# Patient Record
Sex: FEMALE | Race: BLACK OR AFRICAN AMERICAN | Employment: UNEMPLOYED | URBAN - METROPOLITAN AREA
[De-identification: names, ages, dates, MRNs, and addresses within clinical notes are randomized per-mention and may not be internally consistent; named-entity substitution may affect disease eponyms.]

---

## 2018-12-24 RX ORDER — FLECAINIDE ACETATE 100 MG/1
100 TABLET ORAL 2 TIMES DAILY
COMMUNITY

## 2018-12-24 RX ORDER — FESOTERODINE FUMARATE 4 MG/1
TABLET, EXTENDED RELEASE ORAL DAILY
COMMUNITY

## 2018-12-24 RX ORDER — ALBUTEROL SULFATE 90 UG/1
2 AEROSOL, METERED RESPIRATORY (INHALATION)
COMMUNITY

## 2018-12-24 RX ORDER — ASPIRIN 81 MG/1
81 TABLET ORAL DAILY
COMMUNITY

## 2018-12-24 RX ORDER — FLUTICASONE FUROATE AND VILANTEROL 100; 25 UG/1; UG/1
1 POWDER RESPIRATORY (INHALATION) DAILY
COMMUNITY

## 2018-12-27 ENCOUNTER — ANESTHESIA EVENT (OUTPATIENT)
Dept: ENDOSCOPY | Age: 63
End: 2018-12-27
Payer: MEDICARE

## 2018-12-28 ENCOUNTER — ANESTHESIA (OUTPATIENT)
Dept: ENDOSCOPY | Age: 63
End: 2018-12-28
Payer: MEDICARE

## 2018-12-28 ENCOUNTER — HOSPITAL ENCOUNTER (OUTPATIENT)
Age: 63
Setting detail: OUTPATIENT SURGERY
Discharge: HOME | End: 2018-12-28
Attending: INTERNAL MEDICINE | Admitting: INTERNAL MEDICINE
Payer: MEDICARE

## 2018-12-28 VITALS
OXYGEN SATURATION: 100 % | TEMPERATURE: 97.6 F | WEIGHT: 143 LBS | HEIGHT: 62 IN | SYSTOLIC BLOOD PRESSURE: 119 MMHG | DIASTOLIC BLOOD PRESSURE: 62 MMHG | BODY MASS INDEX: 26.31 KG/M2 | HEART RATE: 68 BPM | RESPIRATION RATE: 16 BRPM

## 2018-12-28 PROCEDURE — 76060000031 HC ANESTHESIA FIRST 0.5 HR: Performed by: INTERNAL MEDICINE

## 2018-12-28 PROCEDURE — 76040000019: Performed by: INTERNAL MEDICINE

## 2018-12-28 PROCEDURE — 77030031670 HC DEV INFL ENDOTEK BIG60 MRTM -B: Performed by: INTERNAL MEDICINE

## 2018-12-28 PROCEDURE — 88305 TISSUE EXAM BY PATHOLOGIST: CPT

## 2018-12-28 PROCEDURE — 74011250636 HC RX REV CODE- 250/636: Performed by: NURSE ANESTHETIST, CERTIFIED REGISTERED

## 2018-12-28 PROCEDURE — 74011250636 HC RX REV CODE- 250/636

## 2018-12-28 RX ORDER — DEXTROMETHORPHAN/PSEUDOEPHED 2.5-7.5/.8
1.2 DROPS ORAL
Status: DISCONTINUED | OUTPATIENT
Start: 2018-12-28 | End: 2018-12-28 | Stop reason: HOSPADM

## 2018-12-28 RX ORDER — SODIUM CHLORIDE 0.9 % (FLUSH) 0.9 %
5-10 SYRINGE (ML) INJECTION AS NEEDED
Status: DISCONTINUED | OUTPATIENT
Start: 2018-12-28 | End: 2018-12-28 | Stop reason: HOSPADM

## 2018-12-28 RX ORDER — FAMOTIDINE 20 MG/1
20 TABLET, FILM COATED ORAL ONCE
Status: DISCONTINUED | OUTPATIENT
Start: 2018-12-28 | End: 2018-12-28 | Stop reason: HOSPADM

## 2018-12-28 RX ORDER — SODIUM CHLORIDE 0.9 % (FLUSH) 0.9 %
5-10 SYRINGE (ML) INJECTION EVERY 8 HOURS
Status: DISCONTINUED | OUTPATIENT
Start: 2018-12-28 | End: 2018-12-28 | Stop reason: HOSPADM

## 2018-12-28 RX ORDER — SODIUM CHLORIDE, SODIUM LACTATE, POTASSIUM CHLORIDE, CALCIUM CHLORIDE 600; 310; 30; 20 MG/100ML; MG/100ML; MG/100ML; MG/100ML
25 INJECTION, SOLUTION INTRAVENOUS CONTINUOUS
Status: DISCONTINUED | OUTPATIENT
Start: 2018-12-28 | End: 2018-12-28 | Stop reason: HOSPADM

## 2018-12-28 RX ORDER — PROPOFOL 10 MG/ML
INJECTION, EMULSION INTRAVENOUS AS NEEDED
Status: DISCONTINUED | OUTPATIENT
Start: 2018-12-28 | End: 2018-12-28 | Stop reason: HOSPADM

## 2018-12-28 RX ORDER — LIDOCAINE HYDROCHLORIDE 20 MG/ML
INJECTION, SOLUTION EPIDURAL; INFILTRATION; INTRACAUDAL; PERINEURAL AS NEEDED
Status: DISCONTINUED | OUTPATIENT
Start: 2018-12-28 | End: 2018-12-28 | Stop reason: HOSPADM

## 2018-12-28 RX ADMIN — PROPOFOL 10 MG: 10 INJECTION, EMULSION INTRAVENOUS at 09:13

## 2018-12-28 RX ADMIN — PROPOFOL 10 MG: 10 INJECTION, EMULSION INTRAVENOUS at 09:15

## 2018-12-28 RX ADMIN — LIDOCAINE HYDROCHLORIDE 30 MG: 20 INJECTION, SOLUTION EPIDURAL; INFILTRATION; INTRACAUDAL; PERINEURAL at 09:11

## 2018-12-28 RX ADMIN — PROPOFOL 10 MG: 10 INJECTION, EMULSION INTRAVENOUS at 09:19

## 2018-12-28 RX ADMIN — SODIUM CHLORIDE, SODIUM LACTATE, POTASSIUM CHLORIDE, AND CALCIUM CHLORIDE 25 ML/HR: 600; 310; 30; 20 INJECTION, SOLUTION INTRAVENOUS at 08:48

## 2018-12-28 RX ADMIN — PROPOFOL 10 MG: 10 INJECTION, EMULSION INTRAVENOUS at 09:12

## 2018-12-28 RX ADMIN — PROPOFOL 50 MG: 10 INJECTION, EMULSION INTRAVENOUS at 09:11

## 2018-12-28 RX ADMIN — PROPOFOL 10 MG: 10 INJECTION, EMULSION INTRAVENOUS at 09:17

## 2018-12-28 NOTE — H&P
Date of Surgery Update: 
Mei Rico was seen and examined. History and physical has been reviewed. The patient has been examined.  There have been no significant clinical changes since the completion of the originally dated History and Physical. 
 
Signed By: Aniya Xie MD   
 December 28, 2018 8:26 AM

## 2018-12-28 NOTE — ANESTHESIA POSTPROCEDURE EVALUATION
Procedure(s): 
COLONOSCOPY. Anesthesia Post Evaluation Multimodal analgesia: multimodal analgesia used between 6 hours prior to anesthesia start to PACU discharge Patient location during evaluation: bedside Patient participation: complete - patient participated Level of consciousness: awake Pain management: adequate Airway patency: patent Anesthetic complications: no 
Cardiovascular status: stable Respiratory status: acceptable Hydration status: acceptable Post anesthesia nausea and vomiting:  controlled Visit Vitals /62 Pulse 68 Temp 36.4 °C (97.6 °F) Resp 16 Ht 5' 2\" (1.575 m) Wt 64.9 kg (143 lb) SpO2 100% Breastfeeding? No  
BMI 26.16 kg/m²

## 2018-12-28 NOTE — ANESTHESIA PREPROCEDURE EVALUATION
Anesthetic History No history of anesthetic complications Review of Systems / Medical History Patient summary reviewed and pertinent labs reviewed Pulmonary COPD: mild Sleep apnea: No treatment Smoker Neuro/Psych Within defined limits Cardiovascular Hypertension Dysrhythmias : atrial fibrillation Exercise tolerance: >4 METS Comments: Off eliquis for 3 days GI/Hepatic/Renal 
Within defined limits Endo/Other Within defined limits Other Findings Comments:  
 
 
  
 
 
 
 
Physical Exam 
 
Airway Mallampati: II 
TM Distance: 4 - 6 cm Neck ROM: normal range of motion Mouth opening: Normal 
 
 Cardiovascular Regular rate and rhythm,  S1 and S2 normal,  no murmur, click, rub, or gallop Rhythm: regular Rate: normal 
 
 
 
 Dental 
 
Dentition: Full upper dentures and Full lower dentures Pulmonary Breath sounds clear to auscultation Abdominal 
GI exam deferred Other Findings Anesthetic Plan ASA: 3 Anesthesia type: MAC Induction: Intravenous Anesthetic plan and risks discussed with: Patient

## 2018-12-28 NOTE — PROCEDURES
Colonoscopy Report    Patient: Kathy Bravo MRN: [id number]  SSN: [id number]    YOB: 1955  Age: 59 y.o. Sex: female      Date of Procedure: 12/28/2018    IMPRESSION:  1. Single 5mm ascending colon polyp removed with cold snare. 2. Small hemorrhoids. 3. Otherwise normal colonoscopy including terminal ileum. RECOMMENDATIONS:  1. Resume regular diet, Recommend high fiber. 2. Will contact with polyp results in 2 weeks. These results will determine timing to next screening. Patient will be instructed to contact our office if they have not received the results by three weeks. Indication:  Personal history of colon polyps (screening only)  Procedure Performed: Colonoscopy polypectomy (cold snare)  Endoscopist: Fox Portillo MD  Assistant: Endoscopy Technician-1: Fam Nayak  Endoscopy RN-1: Wallene Aschoff, RN  ASA: ASA 3 - Patient with moderate systemic disease with functional limitations  Mallampati Score: II (soft palate, uvula, fauces visible)  Anesthesia: MAC anesthesia  Endoscope:     [x]  CF H 190AL   []  PCF H190AL   []  GIF H 190    Extent of Examination:terminal ileum  Quality of Preparation:     []  Excellent   [x]  Very Good   [] Fair but adequate   [] Fair, inadequate   []  Poor      Technique: The procedure was discussed with the patient including risks, benefits, alternatives including risks of IV sedation, bleeding, perforation and missed polyp. A safety timeout was performed. The patient was given incremental doses of intravenous sedation to achieve moderate sedation. The patients vital signs were monitored at all times including heart rate, rhythm, blood pressure and oxygen saturation. The patient was placed in left lateral position. When adequate sedation was achieved a perianal inspection and a digital rectal exam were performed. Video colonoscope was introduced into the rectum and advanced under direct vision up to the terminal ileum.  The cecum was identified by IC valve, appendiceal orifice and crows foot. With adequate insufflation and maneuvering of the withdrawing scope, the colonic mucosa was visualized carefully. Retroflexion was performed in the rectum and the distal rectum visualized. The patient tolerated the procedure very well and was transferred to recovery area. Findings:  1. Normal rectal exam.   2. Normal terminal ileum with no ulceration, mass, stricture. 3. There was a single 5mm sessile polyp in the ascending colon. The polyp was removed with cold snare. 4. There were small hemorrhoids in the distal rectum. 5. The colonic mucosa was otherwise normal with no other polyps and no ulceration, mass, stricture.        EBL:Minimal  Specimen:   ID Type Source Tests Collected by Time Destination   1 : cold snare polyp Preservative Colon, Ascending  Sade Ceballos MD 12/28/2018 3570 Pathology       Haylie Ibarra MD  December 28, 2018  9:24 AM

## 2018-12-28 NOTE — DISCHARGE INSTRUCTIONS
Patient Discharge Instructions    Kathy Bravo / [id number] : 1955    Admitted 2018 Discharged: 2018         Procedure Impression:  1. Single 5mm ascending colon polyp removed with cold snare. 2. Small hemorrhoids. 3. Otherwise normal colonoscopy including terminal ileum. Recommendation:  1. Resume regular diet, recommend high fiber  2. Will contact with polyp results in 2 weeks. These results will determine timing to next colon cancer screening. 3. Please contact our office if you have not received the results by three weeks. Recommended Diet: Regular Diet    Recommended Activity:    1. Do not drink alcohol, drive or operate machinery for 12 hours   2. Call if any fever, abdominal pain or bleeding noted. Signed By: Fox Portillo MD     2018         DISCHARGE SUMMARY from Nurse    PATIENT INSTRUCTIONS:    After general anesthesia or intravenous sedation, for 24 hours or while taking prescription Narcotics:  · Limit your activities  · Do not drive and operate hazardous machinery  · Do not make important personal or business decisions  · Do  not drink alcoholic beverages  · If you have not urinated within 8 hours after discharge, please contact your surgeon on call. Report the following to your surgeon:  · Excessive pain, swelling, redness or odor of or around the surgical area  · Temperature over 100.5  · Nausea and vomiting lasting longer than 4 hours or if unable to take medications  · Any signs of decreased circulation or nerve impairment to extremity: change in color, persistent  numbness, tingling, coldness or increase pain  · Any questions    What to do at Home:    These are general instructions for a healthy lifestyle:    No smoking/ No tobacco products/ Avoid exposure to second hand smoke  Surgeon General's Warning:  Quitting smoking now greatly reduces serious risk to your health.     Obesity, smoking, and sedentary lifestyle greatly increases your risk for illness    A healthy diet, regular physical exercise & weight monitoring are important for maintaining a healthy lifestyle    You may be retaining fluid if you have a history of heart failure or if you experience any of the following symptoms:  Weight gain of 3 pounds or more overnight or 5 pounds in a week, increased swelling in our hands or feet or shortness of breath while lying flat in bed. Please call your doctor as soon as you notice any of these symptoms; do not wait until your next office visit. Recognize signs and symptoms of STROKE:    F-face looks uneven    A-arms unable to move or move unevenly    S-speech slurred or non-existent    T-time-call 911 as soon as signs and symptoms begin-DO NOT go       Back to bed or wait to see if you get better-TIME IS BRAIN. Warning Signs of HEART ATTACK     Call 911 if you have these symptoms:   Chest discomfort. Most heart attacks involve discomfort in the center of the chest that lasts more than a few minutes, or that goes away and comes back. It can feel like uncomfortable pressure, squeezing, fullness, or pain.  Discomfort in other areas of the upper body. Symptoms can include pain or discomfort in one or both arms, the back, neck, jaw, or stomach.  Shortness of breath with or without chest discomfort.  Other signs may include breaking out in a cold sweat, nausea, or lightheadedness. Don't wait more than five minutes to call 911 - MINUTES MATTER! Fast action can save your life. Calling 911 is almost always the fastest way to get lifesaving treatment. Emergency Medical Services staff can begin treatment when they arrive -- up to an hour sooner than if someone gets to the hospital by car. The discharge information has been reviewed with the patient. The patient verbalized understanding. Discharge medications reviewed with the patient and appropriate educational materials and side effects teaching were provided.   Patient armband removed and given to patient to take home.   Patient was informed of the privacy risks if armband lost or stolen  ___________________________________________________________________________________________________________________________________

## 2018-12-28 NOTE — PERIOP NOTES
VSS.  Oriented daughter and pt to room and POC. UP to recline. IV removed. Dr. Deyvi Landrum by bedside. PT d/c to home. Daughter, Kin Madelin .

## 2023-09-13 PROBLEM — M77.41 METATARSALGIA OF RIGHT FOOT: Status: ACTIVE | Noted: 2023-09-13

## 2023-09-13 PROBLEM — E66.01 OBESITY, MORBID (HCC): Status: ACTIVE | Noted: 2023-06-16

## 2023-09-13 PROBLEM — G47.01 INSOMNIA SECONDARY TO CHRONIC PAIN: Status: ACTIVE | Noted: 2023-07-17

## 2023-09-13 PROBLEM — M20.41 HAMMERTOE OF RIGHT FOOT: Status: ACTIVE | Noted: 2023-09-13

## 2023-09-13 PROBLEM — M81.0 AGE-RELATED OSTEOPOROSIS WITHOUT CURRENT PATHOLOGICAL FRACTURE: Status: ACTIVE | Noted: 2023-08-23

## 2023-09-13 PROBLEM — M85.852 OSTEOPENIA OF BOTH THIGHS: Status: ACTIVE | Noted: 2023-08-30

## 2023-09-13 PROBLEM — G89.29 INSOMNIA SECONDARY TO CHRONIC PAIN: Status: ACTIVE | Noted: 2023-07-17

## 2023-09-13 PROBLEM — E55.9 VITAMIN D DEFICIENCY: Status: ACTIVE | Noted: 2021-10-14

## 2023-09-13 PROBLEM — M53.3 SI (SACROILIAC) JOINT DYSFUNCTION: Status: ACTIVE | Noted: 2019-03-14

## 2023-09-13 PROBLEM — G56.21 CUBITAL TUNNEL SYNDROME ON RIGHT: Status: ACTIVE | Noted: 2017-10-11

## 2023-09-13 PROBLEM — M48.02 DEGENERATIVE CERVICAL SPINAL STENOSIS: Status: ACTIVE | Noted: 2017-10-11

## 2023-09-13 PROBLEM — F17.200 CURRENT SMOKER: Status: ACTIVE | Noted: 2023-09-13

## 2023-09-13 PROBLEM — M85.851 OSTEOPENIA OF BOTH THIGHS: Status: ACTIVE | Noted: 2023-08-30

## 2023-09-13 PROBLEM — D84.9 IMMUNOSUPPRESSION (HCC): Status: ACTIVE | Noted: 2023-07-26

## 2023-09-13 PROBLEM — I70.0 ATHEROSCLEROSIS OF AORTA (HCC): Status: ACTIVE | Noted: 2019-02-14

## 2024-05-21 ENCOUNTER — OFFICE VISIT (OUTPATIENT)
Age: 69
End: 2024-05-21
Payer: COMMERCIAL

## 2024-05-21 VITALS
OXYGEN SATURATION: 97 % | WEIGHT: 161 LBS | SYSTOLIC BLOOD PRESSURE: 143 MMHG | HEART RATE: 85 BPM | TEMPERATURE: 97.2 F | DIASTOLIC BLOOD PRESSURE: 102 MMHG | BODY MASS INDEX: 29.45 KG/M2

## 2024-05-21 DIAGNOSIS — R06.02 EXERTIONAL SHORTNESS OF BREATH: Primary | ICD-10-CM

## 2024-05-21 DIAGNOSIS — I50.32 CHRONIC DIASTOLIC (CONGESTIVE) HEART FAILURE (HCC): ICD-10-CM

## 2024-05-21 DIAGNOSIS — R60.0 BILATERAL LEG EDEMA: ICD-10-CM

## 2024-05-21 DIAGNOSIS — R00.2 PALPITATIONS: ICD-10-CM

## 2024-05-21 DIAGNOSIS — I48.0 PAROXYSMAL ATRIAL FIBRILLATION (HCC): ICD-10-CM

## 2024-05-21 DIAGNOSIS — R94.31 ABNORMAL ECG: ICD-10-CM

## 2024-05-21 DIAGNOSIS — I10 PRIMARY HYPERTENSION: ICD-10-CM

## 2024-05-21 DIAGNOSIS — R01.1 SYSTOLIC MURMUR: ICD-10-CM

## 2024-05-21 PROCEDURE — 3080F DIAST BP >= 90 MM HG: CPT | Performed by: INTERNAL MEDICINE

## 2024-05-21 PROCEDURE — 99215 OFFICE O/P EST HI 40 MIN: CPT | Performed by: INTERNAL MEDICINE

## 2024-05-21 PROCEDURE — 1123F ACP DISCUSS/DSCN MKR DOCD: CPT | Performed by: INTERNAL MEDICINE

## 2024-05-21 PROCEDURE — 3077F SYST BP >= 140 MM HG: CPT | Performed by: INTERNAL MEDICINE

## 2024-05-21 RX ORDER — DAPAGLIFLOZIN 10 MG/1
10 TABLET, FILM COATED ORAL EVERY MORNING
Qty: 14 TABLET | Refills: 0 | COMMUNITY
Start: 2024-05-21 | End: 2024-06-04

## 2024-05-21 ASSESSMENT — ENCOUNTER SYMPTOMS
EYES NEGATIVE: 1
SHORTNESS OF BREATH: 1
GASTROINTESTINAL NEGATIVE: 1
ALLERGIC/IMMUNOLOGIC NEGATIVE: 1

## 2024-05-21 NOTE — PROGRESS NOTES
Identified pt with two pt identifiers(name and ). Reviewed record in preparation for visit and have obtained necessary documentation.    Lisa Nguyen presents today for No chief complaint on file.      Pt c/o DIZZINESS, SOB, CHEST PRESSURE, FATIGUE/WEAKNESS, HEADACHES, SWELLING (LL).             Lisa Nguyen preferred language for health care discussion is english/other.    Personal Protective Equipment:   Personal Protective Equipment was used including: mask-surgical and hands-gloves. Patient was placed on no precaution(s). Patient was not masked.    Precautions:   Patient currently on None  Patient currently roomed with door closed.    Is someone accompanying this pt? no    Is the patient using any DME equipment during OV? no    Depression Screening:       No data to display                 Learning Assessment:  No question data found.    Abuse Screenin/21/2024    10:00 AM   AMB Abuse Screening   Do you ever feel afraid of your partner? N   Are you in a relationship with someone who physically or mentally threatens you? N   Is it safe for you to go home? Y          Fall Risk      2024    10:57 AM   Fall Risk   2 or more falls in past year? no   Fall with injury in past year? no         Pt currently taking Anticoagulant /Antiplatelet therapy? no    Coordination of Care:  1. Have you been to the ER, urgent care clinic since your last visit? Hospitalized since your last visit? no    2. Have you seen or consulted any other health care providers outside of the [facility] since your last visit? Include any pap smears or colon screening. no      Please see Red banners under Allergies and Med Rec to remove outside inquires. All correct information has been verified with patient and added to chart.     Medication's patient's would liked removed has been marked not taking to be removed per Verbal order and read back per Juan David Obando MD  
09/11/2017    History of supraventricular tachycardia 10/25/2017    Hot flashes     Hypertension     Hypokalemia 02/03/2014    Hyponatremia 02/02/2014    due to HCTZ (now stopped) and psychogenic polydipsia.  >6L fluids / day    Insomnia     Knee pain     Muscle atrophy     Nocturia     Obstructive chronic bronchitis without exacerbation     Osteoarthritis     Rheumatoid arthritis (HCC)     Sciatica     Smoker     Teeth missing 11/2015    Vertical strabismus of left eye         Past Surgical History:   Procedure Laterality Date    BACK SURGERY      12 yrs ago    BUNIONECTOMY  06/23/2017    KNEE ARTHROSCOPY  12/02/2015    LUMBAR LAMINECTOMY  02/20/2020    SPINE SURGERY  06/09/2022        Allergies   Allergen Reactions    Adalimumab      Other reaction(s): rash/itching  Other reaction(s): rash/itching    Oxycodone-Acetaminophen Rash        Current Outpatient Medications   Medication Sig Dispense Refill    dapagliflozin (FARXIGA) 10 MG tablet Take 1 tablet by mouth every morning for 14 days 14 tablet 0    ELIQUIS 5 MG TABS tablet Take 1 tablet by mouth in the morning and at bedtime      aspirin 81 MG EC tablet Take 1 tablet by mouth daily      carvedilol (COREG) 12.5 MG tablet Take 1 tablet by mouth 2 times daily      flecainide (TAMBOCOR) 100 MG tablet Take 1 tablet by mouth 2 times daily      lisinopril (PRINIVIL;ZESTRIL) 20 MG tablet Take 1 tablet by mouth daily      NIFEdipine (PROCARDIA XL) 30 MG extended release tablet Take 1 tablet by mouth daily      darifenacin (ENABLEX) 15 MG extended release tablet Take 1 tablet by mouth daily 90 tablet 3    vibegron (GEMTESA) 75 MG TABS tablet Take 1 tablet by mouth daily 90 tablet 3    levoFLOXacin (LEVAQUIN) 500 MG tablet       clobetasol (TEMOVATE) 0.05 % ointment APPLY 1 (ONE) THIN LAYER TO THE ARMS AND BACK TWICE A DAY, ESPECIALLY AFTER SHOWERING      bumetanide (BUMEX) 1 MG tablet Take 1 tablet by mouth Every Day (Patient not taking: Reported on 5/21/2024)

## 2024-10-23 PROBLEM — M25.562 CHRONIC PAIN OF LEFT KNEE: Status: ACTIVE | Noted: 2023-10-19

## 2024-10-23 PROBLEM — R91.8 LUNG NODULE, MULTIPLE: Status: ACTIVE | Noted: 2024-02-20

## 2024-10-23 PROBLEM — R63.4 WEIGHT LOSS: Status: ACTIVE | Noted: 2023-10-19

## 2024-10-23 PROBLEM — G89.29 CHRONIC PAIN OF LEFT KNEE: Status: ACTIVE | Noted: 2023-10-19

## 2024-10-23 PROBLEM — R53.82 CHRONIC FATIGUE: Status: ACTIVE | Noted: 2023-10-19

## 2024-10-23 PROBLEM — R74.8 ELEVATED ALKALINE PHOSPHATASE LEVEL: Status: ACTIVE | Noted: 2023-11-02

## 2024-10-23 PROBLEM — I50.32 CHRONIC DIASTOLIC HEART FAILURE (HCC): Status: ACTIVE | Noted: 2024-10-23

## 2024-10-30 ENCOUNTER — TELEPHONE (OUTPATIENT)
Age: 69
End: 2024-10-30

## 2024-10-30 NOTE — TELEPHONE ENCOUNTER
----- Message from Dr. Juan David Obando MD sent at 5/21/2024 11:26 AM EDT -----  Regarding: SOB  See if farxiga has helped her SOB. I gave her 2 weeks

## 2024-10-30 NOTE — TELEPHONE ENCOUNTER
Called patient, no answer at the number listed. I did leave a voice message asking her to return call to office.

## 2024-10-31 NOTE — TELEPHONE ENCOUNTER
Called patient in f/u this evening and she identified herself by stating her full name and . I inquired about the medication samples that Dr. Obando gave her for SOB. She said she did take them and it was beneficial, as she was able to do her ADL's, walk to the car and in stores with improvement in her breathing.    I will place a refill in a separate encounter for her and send to Dr. Obando.   I confirmed her pharmacy and also reminded her to call the pharmacy In a couple of days.    Reminded her about her f/u appointment in mid November.

## 2024-10-31 NOTE — TELEPHONE ENCOUNTER
PCP: Kohlerman, Nicholas, MD    Last appt:  5/21/2024   Future Appointments   Date Time Provider Department Center   11/21/2024  8:30 AM BS [facility] AM[facility]   11/21/2024 10:15 AM Juan David Obando Sr., MD [facility] AMB   12/27/2024  8:40 AM Krissy Serrano, APRN - NP [facility] [place] Sched       Requested Prescriptions     Pending Prescriptions Disp Refills    dapagliflozin (FARXIGA) 10 MG tablet 90 tablet 3     Sig: Take 1 tablet by mouth every morning       Request for a 30 or 90 day supply? Provider Discretion    Pharmacy: confirmed with patient.    Other Comments: message related to medication is in the patient's chart.

## 2024-11-05 RX ORDER — DAPAGLIFLOZIN 10 MG/1
10 TABLET, FILM COATED ORAL EVERY MORNING
Qty: 90 TABLET | Refills: 3 | Status: SHIPPED | OUTPATIENT
Start: 2024-11-05

## 2024-11-11 DIAGNOSIS — R06.02 SHORTNESS OF BREATH: Primary | ICD-10-CM

## 2024-11-22 RX ORDER — APIXABAN 5 MG/1
5 TABLET, FILM COATED ORAL 2 TIMES DAILY
Qty: 60 TABLET | Refills: 10 | Status: SHIPPED | OUTPATIENT
Start: 2024-11-22

## 2024-12-09 NOTE — TELEPHONE ENCOUNTER
PCP: Kohlerman, Nicholas, MD    Last appt:  5/21/2024   Future Appointments   Date Time Provider Department Center   12/27/2024  8:40 AM Krissy Serrano APRN - NP [facility] Sched       Requested Prescriptions     Pending Prescriptions Disp Refills    flecainide (TAMBOCOR) 100 MG tablet [Pharmacy Med Name: FLECAINIDE 100MG TABLET 100 Tablet] 60 tablet 10     Sig: TAKE 1 TABLET BY MOUTH TWICE DAILY       Request for a 30 or 90 day supply? Provider Discretion    Pharmacy: confirmed     Other Comments: n/a

## 2024-12-15 RX ORDER — FLECAINIDE ACETATE 100 MG/1
100 TABLET ORAL 2 TIMES DAILY
Qty: 60 TABLET | Refills: 10 | Status: SHIPPED | OUTPATIENT
Start: 2024-12-15

## 2025-01-02 RX ORDER — NIFEDIPINE 30 MG/1
30 TABLET, EXTENDED RELEASE ORAL DAILY
Qty: 30 TABLET | Refills: 10 | Status: SHIPPED | OUTPATIENT
Start: 2025-01-02

## 2025-01-23 ENCOUNTER — OFFICE VISIT (OUTPATIENT)
Age: 70
End: 2025-01-23
Payer: MEDICARE

## 2025-01-23 VITALS
WEIGHT: 166 LBS | OXYGEN SATURATION: 98 % | TEMPERATURE: 97.4 F | DIASTOLIC BLOOD PRESSURE: 73 MMHG | BODY MASS INDEX: 30.55 KG/M2 | SYSTOLIC BLOOD PRESSURE: 106 MMHG | HEIGHT: 62 IN | HEART RATE: 76 BPM

## 2025-01-23 DIAGNOSIS — R94.31 ABNORMAL ECG: ICD-10-CM

## 2025-01-23 DIAGNOSIS — R07.9 CHEST PAIN, UNSPECIFIED TYPE: ICD-10-CM

## 2025-01-23 DIAGNOSIS — I50.32 CHRONIC DIASTOLIC (CONGESTIVE) HEART FAILURE (HCC): ICD-10-CM

## 2025-01-23 DIAGNOSIS — I10 PRIMARY HYPERTENSION: ICD-10-CM

## 2025-01-23 DIAGNOSIS — R00.2 PALPITATIONS: ICD-10-CM

## 2025-01-23 DIAGNOSIS — Z79.01 LONG TERM (CURRENT) USE OF ANTICOAGULANTS: ICD-10-CM

## 2025-01-23 DIAGNOSIS — R05.3 CHRONIC COUGH: ICD-10-CM

## 2025-01-23 DIAGNOSIS — R06.02 EXERTIONAL SHORTNESS OF BREATH: Primary | ICD-10-CM

## 2025-01-23 DIAGNOSIS — Z72.0 TOBACCO ABUSE: ICD-10-CM

## 2025-01-23 DIAGNOSIS — R01.1 SYSTOLIC MURMUR: ICD-10-CM

## 2025-01-23 DIAGNOSIS — I48.0 PAROXYSMAL ATRIAL FIBRILLATION (HCC): ICD-10-CM

## 2025-01-23 DIAGNOSIS — I73.9 CLAUDICATION (HCC): ICD-10-CM

## 2025-01-23 PROCEDURE — 93000 ELECTROCARDIOGRAM COMPLETE: CPT | Performed by: INTERNAL MEDICINE

## 2025-01-23 PROCEDURE — 3074F SYST BP LT 130 MM HG: CPT | Performed by: INTERNAL MEDICINE

## 2025-01-23 PROCEDURE — 1159F MED LIST DOCD IN RCRD: CPT | Performed by: INTERNAL MEDICINE

## 2025-01-23 PROCEDURE — 99215 OFFICE O/P EST HI 40 MIN: CPT | Performed by: INTERNAL MEDICINE

## 2025-01-23 PROCEDURE — 1123F ACP DISCUSS/DSCN MKR DOCD: CPT | Performed by: INTERNAL MEDICINE

## 2025-01-23 PROCEDURE — 3078F DIAST BP <80 MM HG: CPT | Performed by: INTERNAL MEDICINE

## 2025-01-23 PROCEDURE — 1126F AMNT PAIN NOTED NONE PRSNT: CPT | Performed by: INTERNAL MEDICINE

## 2025-01-23 PROCEDURE — 1160F RVW MEDS BY RX/DR IN RCRD: CPT | Performed by: INTERNAL MEDICINE

## 2025-01-23 ASSESSMENT — PATIENT HEALTH QUESTIONNAIRE - PHQ9
2. FEELING DOWN, DEPRESSED OR HOPELESS: NOT AT ALL
1. LITTLE INTEREST OR PLEASURE IN DOING THINGS: NOT AT ALL
SUM OF ALL RESPONSES TO PHQ9 QUESTIONS 1 & 2: 0
SUM OF ALL RESPONSES TO PHQ QUESTIONS 1-9: 0

## 2025-01-23 ASSESSMENT — ENCOUNTER SYMPTOMS
EYES NEGATIVE: 1
GASTROINTESTINAL NEGATIVE: 1
ALLERGIC/IMMUNOLOGIC NEGATIVE: 1
SHORTNESS OF BREATH: 1

## 2025-01-23 NOTE — PROGRESS NOTES
Lisa Nguyen (:  1955) is a 69 y.o. female,Established patient, here for evaluation of the following chief complaint(s):  6 Month Follow-Up    Subjective   SUBJECTIVE/OBJECTIVE:  History of Present Illness  The patient presents for left-sided chest pain, cough, and palpitations. She has a history of atrial fibrillation noted for the last several years. She does have periodic palpitations and was found to have recurrent atrial fibrillation previously. She has had multiple cardioversions prior to her coming to see me. I changed her medications to flecainide and she has done relatively well since that time. She does get palpitations periodically, but rarely. She has atypical-type chest discomfort noted previously and underwent a diagnostic catheterization in  demonstrating no significant occlusion. Patient had a stress test performed in 2019 which was negative    She reports experiencing intermittent, mild left-sided chest pain, described as sharp in nature. The onset of this pain is unpredictable, with no identifiable triggers. The most recent episode occurred yesterday, and the frequency varies from daily to twice daily. Each episode lasts approximately 2 to 3 seconds before resolving spontaneously. She also reports experiencing palpitations.    Additionally, she has been managing a persistent cough for the past 2 to 3 years. Despite her history of smoking, she continues to engage in the habit, albeit at a reduced rate.    She maintains an active lifestyle, including regular walks with her granddaughter, but notes that these activities often result in fatigue.    SOCIAL HISTORY  The patient admits to smoking a little bit but not as much as before.    I have carefully reviewed all available medical records, previous office notes, lab, x-ray and procedure reports    Past Medical History:   Diagnosis Date    Acute systolic heart failure (HCC) 02/10/2014    Amblyopia of left eye     Anxiety

## 2025-01-23 NOTE — PROGRESS NOTES
1. \"Have you been to the ER, urgent care clinic since your last visit?  Hospitalized since your last visit?\" Reviewed by Dr. Juan David Obando     2. \"Have you seen or consulted any other health care providers outside of the [facility] since your last visit?\" Reviewed by Dr. Juan David Obando

## 2025-03-05 ENCOUNTER — TELEPHONE (OUTPATIENT)
Age: 70
End: 2025-03-05

## 2025-03-05 NOTE — TELEPHONE ENCOUNTER
Called Lisa Nguyen, at [phone], no answer, voicemail full.     Called her son Duane Dudley who is on her HIPAA page, at [phone],  two pt identifiers verified, name and  for Lisa Nguyen.    Gave him the results below:    ----- Message from Dr. Juan David Obando MD sent at 3/5/2025 11:03 AM EST -----  Call her let her know her stress test was negative for any blockage    Asked that and asked that he make sure she gets the results.     Lisa Nguyen voiced understanding and states he will.

## 2025-04-08 ENCOUNTER — OFFICE VISIT (OUTPATIENT)
Age: 70
End: 2025-04-08

## 2025-04-08 VITALS — WEIGHT: 154 LBS | RESPIRATION RATE: 16 BRPM | HEIGHT: 62 IN | BODY MASS INDEX: 28.34 KG/M2

## 2025-04-08 DIAGNOSIS — M70.61 TROCHANTERIC BURSITIS OF BOTH HIPS: ICD-10-CM

## 2025-04-08 DIAGNOSIS — M70.62 TROCHANTERIC BURSITIS OF BOTH HIPS: ICD-10-CM

## 2025-04-08 DIAGNOSIS — M76.32 ILIOTIBIAL BAND SYNDROME OF LEFT SIDE: ICD-10-CM

## 2025-04-08 DIAGNOSIS — M25.69 DECREASED ROM OF TRUNK AND BACK: ICD-10-CM

## 2025-04-08 DIAGNOSIS — M47.817 DJD (DEGENERATIVE JOINT DISEASE), LUMBOSACRAL: ICD-10-CM

## 2025-04-08 DIAGNOSIS — M70.62 TROCHANTERIC BURSITIS OF LEFT HIP: ICD-10-CM

## 2025-04-08 DIAGNOSIS — Z98.890 HX OF LUMBOSACRAL SPINE SURGERY: ICD-10-CM

## 2025-04-08 DIAGNOSIS — M47.816 FACET ARTHROPATHY, LUMBAR: ICD-10-CM

## 2025-04-08 DIAGNOSIS — M54.16 LUMBAR RADICULOPATHY: Primary | ICD-10-CM

## 2025-04-08 RX ORDER — TRIAMCINOLONE ACETONIDE 40 MG/ML
80 INJECTION, SUSPENSION INTRA-ARTICULAR; INTRAMUSCULAR ONCE
Status: COMPLETED | OUTPATIENT
Start: 2025-04-08 | End: 2025-04-08

## 2025-04-08 RX ORDER — BUPIVACAINE HYDROCHLORIDE 5 MG/ML
7 INJECTION, SOLUTION PERINEURAL ONCE
Status: COMPLETED | OUTPATIENT
Start: 2025-04-08 | End: 2025-04-08

## 2025-04-08 RX ADMIN — TRIAMCINOLONE ACETONIDE 80 MG: 40 INJECTION, SUSPENSION INTRA-ARTICULAR; INTRAMUSCULAR at 11:12

## 2025-04-08 RX ADMIN — BUPIVACAINE HYDROCHLORIDE 35 MG: 5 INJECTION, SOLUTION PERINEURAL at 11:05

## 2025-04-08 NOTE — PROGRESS NOTES
discussed in detail all patient's questions answered to their satisfaction.        Patient provided a reminder for a \"due or due soon\" health maintenance. I have asked the patient to schedule an appointment with their primary care provider for follow-up on general health maintenance concerns.    Today all the patient's questions were answered to their satisfaction.  Copies of x-rays reviewed if obtained this visit, and provided to patient.        Dictation disclaimer:  Please note that this dictation was completed with \"Dragon\", the computer voice recognition software. Today's exam was dictated using fluency dictation software (voice recognition software). Occasionally, sound-a-like computer induced   dictation errors will populate the report.  Quite often unanticipated grammatical, syntax, homophones, and other interpretive errors are inadvertently transcribed by the computer software.  Please disregard these errors.  Please excuse any errors that have escaped final proofreading.        Roberto SUNG, APC, MPAS, PA-C  [facility]  [facility]

## 2025-04-17 ENCOUNTER — OFFICE VISIT (OUTPATIENT)
Age: 70
End: 2025-04-17

## 2025-04-17 VITALS — BODY MASS INDEX: 28.34 KG/M2 | WEIGHT: 154 LBS | HEIGHT: 62 IN

## 2025-04-17 DIAGNOSIS — M47.816 FACET ARTHROPATHY, LUMBAR: ICD-10-CM

## 2025-04-17 DIAGNOSIS — M25.69 DECREASED ROM OF TRUNK AND BACK: ICD-10-CM

## 2025-04-17 DIAGNOSIS — M70.61 TROCHANTERIC BURSITIS OF RIGHT HIP: Primary | ICD-10-CM

## 2025-04-17 DIAGNOSIS — M47.817 DJD (DEGENERATIVE JOINT DISEASE), LUMBOSACRAL: ICD-10-CM

## 2025-04-17 DIAGNOSIS — Z98.890 HX OF LUMBOSACRAL SPINE SURGERY: ICD-10-CM

## 2025-04-17 DIAGNOSIS — R29.898 WEAKNESS OF RIGHT LEG: ICD-10-CM

## 2025-04-17 DIAGNOSIS — M76.31 IT BAND SYNDROME, RIGHT: ICD-10-CM

## 2025-04-17 DIAGNOSIS — M54.16 LUMBAR RADICULOPATHY: ICD-10-CM

## 2025-04-17 RX ORDER — BUPIVACAINE HYDROCHLORIDE 5 MG/ML
7 INJECTION, SOLUTION PERINEURAL ONCE
Status: COMPLETED | OUTPATIENT
Start: 2025-04-17 | End: 2025-04-17

## 2025-04-17 RX ORDER — TRIAMCINOLONE ACETONIDE 40 MG/ML
80 INJECTION, SUSPENSION INTRA-ARTICULAR; INTRAMUSCULAR ONCE
Status: COMPLETED | OUTPATIENT
Start: 2025-04-17 | End: 2025-04-17

## 2025-04-17 RX ADMIN — TRIAMCINOLONE ACETONIDE 80 MG: 40 INJECTION, SUSPENSION INTRA-ARTICULAR; INTRAMUSCULAR at 12:48

## 2025-04-17 RX ADMIN — BUPIVACAINE HYDROCHLORIDE 35 MG: 5 INJECTION, SOLUTION PERINEURAL at 12:47

## 2025-04-17 NOTE — PROGRESS NOTES
[facility] AMBULATORY OFFICE NOTE    Patient: Lisa Nguyen                MRN: [id number]       SSN: [id number]  YOB: 1955        AGE: 70 y.o.        SEX: female      OFFICE NOTE DICTATED    PCP: Kohlerman, Nicholas, MD  04/17/25 4/17/2025: Patient returns to the office with a complaint of right hip pain.  She was previously treated for similar of the left hip and provide low-dose cortisone injection while under the diagnosis of trochanteric bursitis.  Since the injection she has no left hip pain and the radiating discomfort has also resolved.  She does have a history of lumbar radiculopathy as well as degenerative joint disease of the lumbar spine and is status post lumbar surgical intervention.  She denies any bowel or bladder incontinence today.  She would like treatment similar to the left hip for the right hip today.  She would also like a referral to orthopedic spine surgeon for recommendations on treatment to include possible MENDEZ/selective nerve root blocks versus surgical intervention for her chronic back pain.    Chief Complaint   Patient presents with    Follow-up     Right         HISTORY:    Lisa Nguyen is a 70 y.o. female presents to the office with complaint of bilateral leg and low back pain.  She has a history of a lumbar laminectomy dating back about 5 years.  She is uncertain of the level that was removed.  She has been seen by her PCP her recent and notes difficulties with certain motions particularly when bending of the knees.  Waist forward and backward.  She also has pain in both legs radiating from the hips down to the knees.  No history of trauma.  No overt bowel or bladder incontinence.  She does have urinary urgency.            Failed to redirect to the Timeline version of the [employer] SmartLink.    No results found for: \"HBA1C\", \"[id number]\"      4/17/2025    10:50 AM 4/8/2025    10:13 AM 3/20/2025     8:19 AM 3/4/2025     9:52 AM 1/23/2025

## 2025-08-26 ENCOUNTER — OFFICE VISIT (OUTPATIENT)
Age: 70
End: 2025-08-26
Payer: MEDICARE

## 2025-08-26 VITALS
OXYGEN SATURATION: 97 % | BODY MASS INDEX: 29.66 KG/M2 | DIASTOLIC BLOOD PRESSURE: 84 MMHG | HEART RATE: 91 BPM | SYSTOLIC BLOOD PRESSURE: 122 MMHG | RESPIRATION RATE: 16 BRPM | HEIGHT: 62 IN | WEIGHT: 161.2 LBS

## 2025-08-26 DIAGNOSIS — R06.02 EXERTIONAL SHORTNESS OF BREATH: Primary | ICD-10-CM

## 2025-08-26 DIAGNOSIS — R01.1 SYSTOLIC MURMUR: ICD-10-CM

## 2025-08-26 DIAGNOSIS — I48.0 PAROXYSMAL ATRIAL FIBRILLATION (HCC): ICD-10-CM

## 2025-08-26 DIAGNOSIS — R00.2 PALPITATIONS: ICD-10-CM

## 2025-08-26 DIAGNOSIS — Z72.0 TOBACCO ABUSE: ICD-10-CM

## 2025-08-26 DIAGNOSIS — R60.0 BILATERAL LEG EDEMA: ICD-10-CM

## 2025-08-26 DIAGNOSIS — I73.9 CLAUDICATION: ICD-10-CM

## 2025-08-26 DIAGNOSIS — I50.32 CHRONIC DIASTOLIC (CONGESTIVE) HEART FAILURE (HCC): ICD-10-CM

## 2025-08-26 DIAGNOSIS — Z79.01 LONG TERM (CURRENT) USE OF ANTICOAGULANTS: ICD-10-CM

## 2025-08-26 DIAGNOSIS — R05.3 CHRONIC COUGH: ICD-10-CM

## 2025-08-26 DIAGNOSIS — R94.31 ABNORMAL ECG: ICD-10-CM

## 2025-08-26 DIAGNOSIS — I10 PRIMARY HYPERTENSION: ICD-10-CM

## 2025-08-26 PROCEDURE — 1126F AMNT PAIN NOTED NONE PRSNT: CPT | Performed by: INTERNAL MEDICINE

## 2025-08-26 PROCEDURE — 1123F ACP DISCUSS/DSCN MKR DOCD: CPT | Performed by: INTERNAL MEDICINE

## 2025-08-26 PROCEDURE — 1159F MED LIST DOCD IN RCRD: CPT | Performed by: INTERNAL MEDICINE

## 2025-08-26 PROCEDURE — 99215 OFFICE O/P EST HI 40 MIN: CPT | Performed by: INTERNAL MEDICINE

## 2025-08-26 PROCEDURE — 3074F SYST BP LT 130 MM HG: CPT | Performed by: INTERNAL MEDICINE

## 2025-08-26 PROCEDURE — 3079F DIAST BP 80-89 MM HG: CPT | Performed by: INTERNAL MEDICINE

## 2025-08-26 ASSESSMENT — ENCOUNTER SYMPTOMS
SHORTNESS OF BREATH: 1
GASTROINTESTINAL NEGATIVE: 1
ALLERGIC/IMMUNOLOGIC NEGATIVE: 1
EYES NEGATIVE: 1

## (undated) DEVICE — KENDALL 500 SERIES DIAPHORETIC FOAM MONITORING ELECTRODE - TEAR DROP SHAPE ( 30/PK): Brand: KENDALL

## (undated) DEVICE — KIT COLON W/ 1.1OZ LUB AND 2 END

## (undated) DEVICE — MEDI-VAC NON-CONDUCTIVE SUCTION TUBING: Brand: CARDINAL HEALTH

## (undated) DEVICE — CONTAINER PREFIL FRMLN 15ML --

## (undated) DEVICE — DEVICE INFL 60ML 12ATM CONVENIENT LOK REL HNDL HI PRSS FLX

## (undated) DEVICE — SYR 50ML SLIP TIP NSAF LF STRL --

## (undated) DEVICE — FLEX ADVANTAGE 1500CC: Brand: FLEX ADVANTAGE

## (undated) DEVICE — BASIN EMESIS 500CC ROSE 250/CS 60/PLT: Brand: MEDEGEN MEDICAL PRODUCTS, LLC